# Patient Record
Sex: MALE | Race: WHITE | NOT HISPANIC OR LATINO | Employment: PART TIME | URBAN - METROPOLITAN AREA
[De-identification: names, ages, dates, MRNs, and addresses within clinical notes are randomized per-mention and may not be internally consistent; named-entity substitution may affect disease eponyms.]

---

## 2020-02-29 ENCOUNTER — OFFICE VISIT (OUTPATIENT)
Dept: URGENT CARE | Facility: MEDICAL CENTER | Age: 30
End: 2020-02-29
Payer: COMMERCIAL

## 2020-02-29 VITALS
HEART RATE: 75 BPM | SYSTOLIC BLOOD PRESSURE: 108 MMHG | BODY MASS INDEX: 23.05 KG/M2 | OXYGEN SATURATION: 97 % | DIASTOLIC BLOOD PRESSURE: 72 MMHG | WEIGHT: 170 LBS | RESPIRATION RATE: 16 BRPM | TEMPERATURE: 99.3 F

## 2020-02-29 DIAGNOSIS — L23.7 ALLERGIC DERMATITIS DUE TO POISON OAK: ICD-10-CM

## 2020-02-29 PROCEDURE — 99203 OFFICE O/P NEW LOW 30 MIN: CPT | Performed by: PHYSICIAN ASSISTANT

## 2020-02-29 RX ORDER — TRIAMCINOLONE ACETONIDE 1 MG/ML
1 LOTION TOPICAL 3 TIMES DAILY
Qty: 1 BOTTLE | Refills: 1 | Status: SHIPPED | OUTPATIENT
Start: 2020-02-29 | End: 2020-04-04 | Stop reason: SDUPTHER

## 2020-02-29 RX ORDER — CETIRIZINE HYDROCHLORIDE 10 MG/1
10 TABLET ORAL DAILY
Qty: 30 TAB | Refills: 0 | Status: SHIPPED | OUTPATIENT
Start: 2020-02-29 | End: 2021-11-27

## 2020-02-29 RX ORDER — PREDNISONE 10 MG/1
TABLET ORAL
Qty: 1 QUANTITY SUFFICIENT | Refills: 0 | Status: SHIPPED | OUTPATIENT
Start: 2020-02-29 | End: 2021-11-27

## 2020-02-29 ASSESSMENT — ENCOUNTER SYMPTOMS
SHORTNESS OF BREATH: 0
SORE THROAT: 0
COUGH: 0
FEVER: 0
FATIGUE: 0

## 2020-02-29 NOTE — PROGRESS NOTES
Subjective:   Edil Abrams is a 29 y.o. male who presents for Poison [place] (x1 day)        Rash   This is a new problem. The current episode started in the past 7 days. The problem has been gradually worsening since onset. The affected locations include the left hand, left wrist, left arm, torso, chest, back, neck, abdomen, groin, right lower leg, left lower leg, right wrist and right hand. The rash is characterized by blistering, redness, swelling and itchiness. Associated with: poison oak. Pertinent negatives include no congestion, cough, facial edema, fatigue, fever, joint pain, shortness of breath or sore throat. Treatments tried: technu, antiitch cream. The treatment provided no relief.     Review of Systems   Constitutional: Negative for fatigue and fever.   HENT: Negative for congestion and sore throat.    Respiratory: Negative for cough and shortness of breath.    Musculoskeletal: Negative for joint pain.   Skin: Positive for rash.       PMH:  has a past medical history of Allergy.  MEDS:   Current Outpatient Medications:   •  predniSONE (DELTASONE) 10 MG Tab, Day 1-3: 40 mg, day 4-6: 30 mg, day 7 - 9: 20 mg, day 10 - 12: 10 mg, Disp: 1 Quantity Sufficient, Rfl: 0  •  triamcinolone (KENALOG) 0.1 % lotion, Apply 1 Units to affected area(s) 3 times a day., Disp: 1 Bottle, Rfl: 1  •  cetirizine (ZYRTEC ALLERGY) 10 MG Tab, Take 1 Tab by mouth every day., Disp: 30 Tab, Rfl: 0  •  acetaminophen (TYLENOL) 325 MG Tab, Take 650 mg by mouth every four hours as needed., Disp: , Rfl:   •  ondansetron (ZOFRAN ODT) 4 MG TABLET DISPERSIBLE, Take 1 Tab by mouth every 8 hours as needed. (Patient not taking: Reported on 2/29/2020), Disp: 10 Tab, Rfl: 0  ALLERGIES: No Known Allergies  SURGHX: No past surgical history on file.  SOCHX:  reports that he has never smoked. He does not have any smokeless tobacco history on file. He reports current alcohol use. He reports that he does not use drugs.  FH: Family history was  reviewed, no pertinent findings to report   Objective:   /72   Pulse 75   Temp 37.4 °C (99.3 °F) (Temporal)   Resp 16   Wt 77.1 kg (170 lb)   SpO2 97%   BMI 23.05 kg/m²   Physical Exam  Vitals signs reviewed.   Constitutional:       General: He is not in acute distress.     Appearance: Normal appearance. He is well-developed. He is not toxic-appearing.   HENT:      Head: Normocephalic and atraumatic.      Right Ear: External ear normal.      Left Ear: External ear normal.      Nose: Nose normal.   Eyes:      General: Lids are normal.      Conjunctiva/sclera: Conjunctivae normal.   Neck:      Musculoskeletal: Neck supple.   Cardiovascular:      Rate and Rhythm: Normal rate and regular rhythm.   Pulmonary:      Effort: Pulmonary effort is normal. No respiratory distress.      Breath sounds: Normal breath sounds.   Musculoskeletal:      Comments: Normal range of motion. Exhibits no edema and no tenderness.    Skin:     General: Skin is warm and dry.      Capillary Refill: Capillary refill takes less than 2 seconds.             Comments: Diffusely distributed erythematous maculopapular rash with well formed blisters on dorsal aspect of left forearm.  Negative Nikolsky sign.   Neurological:      Mental Status: He is alert and oriented to person, place, and time.      Cranial Nerves: No cranial nerve deficit.      Sensory: No sensory deficit.   Psychiatric:         Speech: Speech normal.         Behavior: Behavior normal.         Thought Content: Thought content normal.         Judgment: Judgment normal.           Assessment/Plan:   1. Allergic dermatitis due to poison oak  - predniSONE (DELTASONE) 10 MG Tab; Day 1-3: 40 mg, day 4-6: 30 mg, day 7 - 9: 20 mg, day 10 - 12: 10 mg  Dispense: 1 Quantity Sufficient; Refill: 0  - triamcinolone (KENALOG) 0.1 % lotion; Apply 1 Units to affected area(s) 3 times a day.  Dispense: 1 Bottle; Refill: 1  - cetirizine (ZYRTEC ALLERGY) 10 MG Tab; Take 1 Tab by mouth every day.   Dispense: 30 Tab; Refill: 0    History and presentation consistent with poison oak.  Due to diffuse distribution I do recommend patient starting on oral steroids with taper.  Risks, benefits, side effects of this medication discussed with patient.  He consents to treatment.  Patient also started on daily antihistamine and topical corticosteroid.  Patient verbalized good understanding of follow-up and return precautions.    Differential diagnosis, natural history, supportive care, and indications for immediate follow-up discussed.

## 2020-02-29 NOTE — PATIENT INSTRUCTIONS
1.  Start 12-day course of prednisone per directions.  2.  Start Zyrtec daily.  3.  Apply topical Kenalog cream 2-3 times a day as needed for itching and swelling.  4.  Launder all clothes with hot water and So dish soap.  Dry on high heat.  5.  Use check new or rubbing alcohol to treat any and treated areas, in order to remove the oils.  6.  If symptoms worsen, fail to improve, or fail to fully resolve please see your primary or return to urgent care for reevaluation.

## 2020-04-04 ENCOUNTER — OFFICE VISIT (OUTPATIENT)
Dept: URGENT CARE | Facility: PHYSICIAN GROUP | Age: 30
End: 2020-04-04
Payer: COMMERCIAL

## 2020-04-04 VITALS
BODY MASS INDEX: 23.05 KG/M2 | WEIGHT: 170 LBS | OXYGEN SATURATION: 98 % | RESPIRATION RATE: 16 BRPM | HEART RATE: 78 BPM | TEMPERATURE: 97.8 F | SYSTOLIC BLOOD PRESSURE: 110 MMHG | DIASTOLIC BLOOD PRESSURE: 80 MMHG

## 2020-04-04 DIAGNOSIS — L23.7 POISON OAK DERMATITIS: ICD-10-CM

## 2020-04-04 PROCEDURE — 99214 OFFICE O/P EST MOD 30 MIN: CPT | Performed by: PHYSICIAN ASSISTANT

## 2020-04-04 RX ORDER — PREDNISONE 10 MG/1
TABLET ORAL
Qty: 25 TAB | Refills: 0 | Status: SHIPPED | OUTPATIENT
Start: 2020-04-04 | End: 2021-11-27

## 2020-04-04 RX ORDER — TRIAMCINOLONE ACETONIDE 1 MG/ML
1 LOTION TOPICAL 3 TIMES DAILY
Qty: 1 BOTTLE | Refills: 1 | Status: SHIPPED | OUTPATIENT
Start: 2020-04-04 | End: 2021-11-27

## 2020-04-04 ASSESSMENT — ENCOUNTER SYMPTOMS
NAUSEA: 0
SORE THROAT: 0
CHILLS: 0
WHEEZING: 0
FEVER: 0
VOMITING: 0
COUGH: 0
SHORTNESS OF BREATH: 0
SPUTUM PRODUCTION: 0

## 2020-04-04 NOTE — PROGRESS NOTES
Subjective:   Edil Abrams  is a 29 y.o. male who presents for Poison [place] (B arms and L leg/ Pt is requesting for a prednisone and kenalog lotion)        Patient comes clinic noting last 2 days of similar symptoms of poison oak dermatitis that he had in February.  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  He notes recent exposure to what he suspects to be poison oak once again with similar feeling burning type rash to bilateral wrists that area of exposure.  Notes some progression of forearms bilaterally status post itching.  He has been taking cool showers and using ointments but is concerned with potential progression towards blistering that has occurred in the past.  He does seem to be sensitive to poison oak.  Was well resolved with a course of prednisone and Kenalog cream and requests this once again today.  He denies fevers chills or cough.  Denies shortness of breath.  Denies feeling sick.    Other   Associated symptoms include a rash. Pertinent negatives include no chills, coughing, fever, nausea, sore throat or vomiting.     Review of Systems   Constitutional: Negative for chills and fever.   HENT: Negative for sore throat.    Respiratory: Negative for cough, sputum production, shortness of breath and wheezing.    Gastrointestinal: Negative for nausea and vomiting.   Skin: Positive for itching and rash.     No Known Allergies   I have worn an N95 mask, gloves and eye protection for the entire encounter with this patient.    Objective:   /80   Pulse 78   Temp 36.6 °C (97.8 °F) (Temporal)   Resp 16   Wt 77.1 kg (170 lb)   SpO2 98%   BMI 23.05 kg/m²   Physical Exam  Vitals signs and nursing note reviewed.   Constitutional:       General: He is not in acute distress.     Appearance: He is well-developed. He is not diaphoretic.   HENT:      Head: Normocephalic and atraumatic.      Right Ear: External ear normal.      Left Ear: External  ear normal.      Nose: Nose normal.   Eyes:      General: No scleral icterus.        Right eye: No discharge.         Left eye: No discharge.      Conjunctiva/sclera: Conjunctivae normal.   Neck:      Musculoskeletal: Neck supple.   Pulmonary:      Effort: Pulmonary effort is normal. No respiratory distress.   Musculoskeletal: Normal range of motion.   Skin:     General: Skin is warm.      Coloration: Skin is not pale.      Findings: Erythema and rash present. Rash is not crusting, papular, purpuric, pustular, urticarial or vesicular.      Comments: Bilateral forearms with very slightly raised erythematous rash, nonspecific, no vesicles, no pustules, non-papular, no blisters, skin intact   Neurological:      Mental Status: He is alert and oriented to person, place, and time.      Coordination: Coordination normal.       Solu-Medrol-patient declined        Assessment/Plan:   1. Poison oak dermatitis  - predniSONE (DELTASONE) 10 MG Tab; Take 1 tab three times a day for 3 days, then 1 tab twice a day for 3 days, then 1 tab once a day for 3 days, the 1/2 tab daily for 4 days. Take with food.  Dispense: 25 Tab; Refill: 0  - triamcinolone (KENALOG) 0.1 % lotion; Apply 1 Units to affected area(s) 3 times a day.  Dispense: 1 Bottle; Refill: 1  Supportive care is reviewed with patient/caregiver -I reviewed with patient we may be able to shorten course of rash/irritation with Solu-Medrol injection clinic-he declines this preferring prior treatment course- Cautioned regarding potential side effects of steroid, avoid nsaids while using  Return to clinic with lack of resolution or progression of symptoms.  Cautioned to of avoid spreading oils with application of topical medication, cautioned for avoiding hot baths or submersion in warm water      Differential diagnosis, natural history, supportive care, and indications for immediate follow-up discussed.

## 2021-11-27 ENCOUNTER — OFFICE VISIT (OUTPATIENT)
Dept: URGENT CARE | Facility: PHYSICIAN GROUP | Age: 31
End: 2021-11-27
Payer: COMMERCIAL

## 2021-11-27 VITALS
SYSTOLIC BLOOD PRESSURE: 102 MMHG | TEMPERATURE: 98.3 F | HEIGHT: 72 IN | RESPIRATION RATE: 16 BRPM | OXYGEN SATURATION: 99 % | BODY MASS INDEX: 23.03 KG/M2 | WEIGHT: 170 LBS | HEART RATE: 63 BPM | DIASTOLIC BLOOD PRESSURE: 60 MMHG

## 2021-11-27 DIAGNOSIS — L23.7 POISON OAK DERMATITIS: ICD-10-CM

## 2021-11-27 PROCEDURE — 99213 OFFICE O/P EST LOW 20 MIN: CPT | Performed by: FAMILY MEDICINE

## 2021-11-27 RX ORDER — TRIAMCINOLONE ACETONIDE 1 MG/ML
LOTION TOPICAL
Qty: 60 ML | Refills: 0 | Status: SHIPPED | OUTPATIENT
Start: 2021-11-27

## 2021-11-27 RX ORDER — PREDNISONE 10 MG/1
TABLET ORAL
Qty: 20 TABLET | Refills: 0 | Status: CANCELLED | OUTPATIENT
Start: 2021-11-27 | End: 2021-12-10

## 2021-11-27 RX ORDER — PREDNISONE 20 MG/1
TABLET ORAL
Qty: 15 TABLET | Refills: 0 | Status: SHIPPED | OUTPATIENT
Start: 2021-11-27 | End: 2021-12-07

## 2021-11-27 NOTE — PROGRESS NOTES
Chief Complaint:    Chief Complaint   Patient presents with   • Rash     Poison Oak rash on the back of the neck, x3-4days        History of Present Illness:    He has itchy rash on back of neck, back of head, and arms x 3-4 days after coming into contact with poison oak. He had similar problem, seen 4/4/2020 by other provider, visit reviewed. Prednisone and Kenalog lotion were helpful and tolerated.      Past Medical History:    Past Medical History:   Diagnosis Date   • Allergy      Past Surgical History:    History reviewed. No pertinent surgical history.    Social History:    Social History     Socioeconomic History   • Marital status: Single     Spouse name: Not on file   • Number of children: Not on file   • Years of education: Not on file   • Highest education level: Not on file   Occupational History   • Not on file   Tobacco Use   • Smoking status: Never Smoker   • Smokeless tobacco: Never Used   Substance and Sexual Activity   • Alcohol use: Yes     Comment: weekly   • Drug use: No   • Sexual activity: Not on file   Other Topics Concern   • Not on file   Social History Narrative   • Not on file     Social Determinants of Health     Financial Resource Strain:    • Difficulty of Paying Living Expenses: Not on file   Food Insecurity:    • Worried About Running Out of Food in the Last Year: Not on file   • Ran Out of Food in the Last Year: Not on file   Transportation Needs:    • Lack of Transportation (Medical): Not on file   • Lack of Transportation (Non-Medical): Not on file   Physical Activity:    • Days of Exercise per Week: Not on file   • Minutes of Exercise per Session: Not on file   Stress:    • Feeling of Stress : Not on file   Social Connections:    • Frequency of Communication with Friends and Family: Not on file   • Frequency of Social Gatherings with Friends and Family: Not on file   • Attends [employer] Services: Not on file   • Active Member of Clubs or Organizations: Not on file   • Attends  Club or Organization Meetings: Not on file   • Marital Status: Not on file   Intimate Partner Violence:    • Fear of Current or Ex-Partner: Not on file   • Emotionally Abused: Not on file   • Physically Abused: Not on file   • Sexually Abused: Not on file   Housing Stability:    • Unable to Pay for Housing in the Last Year: Not on file   • Number of Places Lived in the Last Year: Not on file   • Unstable Housing in the Last Year: Not on file     Family History:    History reviewed. No pertinent family history.    Medications:    No current outpatient medications on file prior to visit.     No current facility-administered medications on file prior to visit.     Allergies:    No Known Allergies      Vitals:    Vitals:    21 1000   BP: 102/60   Pulse: 63   Resp: 16   Temp: 36.8 °C (98.3 °F)   TempSrc: Temporal   SpO2: 99%   Weight: 77.1 kg (170 lb)   Height: 1.829 m (6')       Physical Exam:    Constitutional: Vital signs reviewed. Appears well-developed and well-nourished. No acute distress.   Eyes: Sclera white, conjunctivae clear.   ENT: External ears normal. Hearing normal.  Pulmonary/Chest: Respirations non-labored.   Musculoskeletal: Normal gait. Normal range of motion. No tenderness to palpation. No muscular atrophy or weakness.  Neurological: Alert and oriented to person, place, and time. Muscle tone normal. Coordination normal.   Skin: Erythematous raised rash on back of neck and on arms, consistent with poison oak dermatitis. Severity is mild-moderate.  Psychiatric: Normal mood and affect. Behavior is normal. Judgment and thought content normal.       Medical Decision Makin. Poison oak dermatitis  - predniSONE (DELTASONE) 20 MG Tab; 2 TABS BY MOUTH ONCE A DAY ON DAYS 1-5, 1 TAB ONCE A DAY ON DAYS 6-10. TAKE WITH FOOD.  Dispense: 15 Tablet; Refill: 0  - triamcinolone (KENALOG) 0.1 % lotion; Apply thin film to rash up to 3 times a day only if needed.  Dispense: 60 mL; Refill: 0      Discussed with  him DDX, management options, and risks, benefits, and alternatives to treatment plan agreed upon.    He has itchy rash on back of neck, back of head, and arms x 3-4 days after coming into contact with poison oak. He had similar problem, seen 4/4/2020 by other provider, visit reviewed. Prednisone and Kenalog lotion were helpful and tolerated.    Erythematous raised rash on back of neck and on arms, consistent with poison oak dermatitis. Severity is mild-moderate.    Agreeable to medications prescribed.    Discussed expected course of duration, time for improvement, and to seek follow-up in Emergency Room, urgent care, or with PCP if getting worse at any time or not improving within expected time frame.

## 2022-11-23 ENCOUNTER — HOSPITAL ENCOUNTER (OUTPATIENT)
Dept: LAB | Facility: MEDICAL CENTER | Age: 32
End: 2022-11-23
Attending: STUDENT IN AN ORGANIZED HEALTH CARE EDUCATION/TRAINING PROGRAM
Payer: COMMERCIAL

## 2022-11-23 LAB
ALBUMIN SERPL BCP-MCNC: 4.6 G/DL (ref 3.2–4.9)
ALBUMIN/GLOB SERPL: 1.6 G/DL
ALP SERPL-CCNC: 87 U/L (ref 30–99)
ALT SERPL-CCNC: 18 U/L (ref 2–50)
ANION GAP SERPL CALC-SCNC: 11 MMOL/L (ref 7–16)
AST SERPL-CCNC: 20 U/L (ref 12–45)
BASOPHILS # BLD AUTO: 1.2 % (ref 0–1.8)
BASOPHILS # BLD: 0.06 K/UL (ref 0–0.12)
BILIRUB SERPL-MCNC: 0.6 MG/DL (ref 0.1–1.5)
BUN SERPL-MCNC: 16 MG/DL (ref 8–22)
CALCIUM SERPL-MCNC: 9.7 MG/DL (ref 8.5–10.5)
CHLORIDE SERPL-SCNC: 107 MMOL/L (ref 96–112)
CHOLEST SERPL-MCNC: 212 MG/DL (ref 100–199)
CO2 SERPL-SCNC: 25 MMOL/L (ref 20–33)
CREAT SERPL-MCNC: 0.95 MG/DL (ref 0.5–1.4)
EOSINOPHIL # BLD AUTO: 0.1 K/UL (ref 0–0.51)
EOSINOPHIL NFR BLD: 2 % (ref 0–6.9)
ERYTHROCYTE [DISTWIDTH] IN BLOOD BY AUTOMATED COUNT: 39.2 FL (ref 35.9–50)
FASTING STATUS PATIENT QL REPORTED: NORMAL
GFR SERPLBLD CREATININE-BSD FMLA CKD-EPI: 110 ML/MIN/1.73 M 2
GLOBULIN SER CALC-MCNC: 2.8 G/DL (ref 1.9–3.5)
GLUCOSE SERPL-MCNC: 103 MG/DL (ref 65–99)
HCT VFR BLD AUTO: 48.8 % (ref 42–52)
HDLC SERPL-MCNC: 65 MG/DL
HGB BLD-MCNC: 16.7 G/DL (ref 14–18)
IMM GRANULOCYTES # BLD AUTO: 0.02 K/UL (ref 0–0.11)
IMM GRANULOCYTES NFR BLD AUTO: 0.4 % (ref 0–0.9)
LDLC SERPL CALC-MCNC: 133 MG/DL
LYMPHOCYTES # BLD AUTO: 1.63 K/UL (ref 1–4.8)
LYMPHOCYTES NFR BLD: 33.3 % (ref 22–41)
MCH RBC QN AUTO: 29.8 PG (ref 27–33)
MCHC RBC AUTO-ENTMCNC: 34.2 G/DL (ref 33.7–35.3)
MCV RBC AUTO: 87 FL (ref 81.4–97.8)
MONOCYTES # BLD AUTO: 0.41 K/UL (ref 0–0.85)
MONOCYTES NFR BLD AUTO: 8.4 % (ref 0–13.4)
NEUTROPHILS # BLD AUTO: 2.67 K/UL (ref 1.82–7.42)
NEUTROPHILS NFR BLD: 54.7 % (ref 44–72)
NRBC # BLD AUTO: 0 K/UL
NRBC BLD-RTO: 0 /100 WBC
PLATELET # BLD AUTO: 276 K/UL (ref 164–446)
PMV BLD AUTO: 10 FL (ref 9–12.9)
POTASSIUM SERPL-SCNC: 4.2 MMOL/L (ref 3.6–5.5)
PROT SERPL-MCNC: 7.4 G/DL (ref 6–8.2)
RBC # BLD AUTO: 5.61 M/UL (ref 4.7–6.1)
SODIUM SERPL-SCNC: 143 MMOL/L (ref 135–145)
TRIGL SERPL-MCNC: 69 MG/DL (ref 0–149)
WBC # BLD AUTO: 4.9 K/UL (ref 4.8–10.8)

## 2022-11-23 PROCEDURE — 85025 COMPLETE CBC W/AUTO DIFF WBC: CPT

## 2022-11-23 PROCEDURE — 80061 LIPID PANEL: CPT

## 2022-11-23 PROCEDURE — 80053 COMPREHEN METABOLIC PANEL: CPT

## 2022-11-23 PROCEDURE — 36415 COLL VENOUS BLD VENIPUNCTURE: CPT
